# Patient Record
Sex: MALE | Race: WHITE | NOT HISPANIC OR LATINO | URBAN - METROPOLITAN AREA
[De-identification: names, ages, dates, MRNs, and addresses within clinical notes are randomized per-mention and may not be internally consistent; named-entity substitution may affect disease eponyms.]

---

## 2020-03-04 ENCOUNTER — NEW PATIENT (OUTPATIENT)
Dept: URBAN - METROPOLITAN AREA CLINIC 85 | Facility: CLINIC | Age: 80
End: 2020-03-04
Payer: MEDICARE

## 2020-03-04 PROCEDURE — 92004 COMPRE OPH EXAM NEW PT 1/>: CPT | Performed by: OPHTHALMOLOGY

## 2020-03-04 RX ORDER — KETOROLAC TROMETHAMINE 5 MG/ML
0.5 % SOLUTION OPHTHALMIC
Qty: 1 | Refills: 1 | Status: INACTIVE
Start: 2020-03-04 | End: 2020-03-05

## 2020-03-04 ASSESSMENT — KERATOMETRY
OS: 42.25
OD: 42.38

## 2020-03-04 ASSESSMENT — VISUAL ACUITY
OS: 20/50
OD: 20/30

## 2020-03-04 ASSESSMENT — INTRAOCULAR PRESSURE
OD: 14
OS: 14

## 2020-03-05 ENCOUNTER — Encounter (OUTPATIENT)
Dept: URBAN - METROPOLITAN AREA CLINIC 85 | Facility: CLINIC | Age: 80
End: 2020-03-05
Payer: MEDICARE

## 2020-03-05 RX ORDER — PREDNISOLONE ACETATE 10 MG/ML
1 % SUSPENSION/ DROPS OPHTHALMIC
Qty: 1 | Refills: 1 | Status: INACTIVE
Start: 2020-03-05 | End: 2021-02-16

## 2020-05-22 ENCOUNTER — Encounter (OUTPATIENT)
Dept: URBAN - METROPOLITAN AREA CLINIC 85 | Facility: CLINIC | Age: 80
End: 2020-05-22
Payer: MEDICARE

## 2020-05-22 DIAGNOSIS — Z01.818 ENCOUNTER FOR OTHER PREPROCEDURAL EXAMINATION: Primary | ICD-10-CM

## 2020-05-22 PROCEDURE — 99213 OFFICE O/P EST LOW 20 MIN: CPT | Performed by: PHYSICIAN ASSISTANT

## 2021-02-16 ENCOUNTER — FOLLOW UP ESTABLISHED (OUTPATIENT)
Dept: URBAN - METROPOLITAN AREA CLINIC 85 | Facility: CLINIC | Age: 81
End: 2021-02-16
Payer: MEDICARE

## 2021-02-16 PROCEDURE — 92134 CPTRZ OPH DX IMG PST SGM RTA: CPT | Performed by: OPHTHALMOLOGY

## 2021-02-16 PROCEDURE — 92014 COMPRE OPH EXAM EST PT 1/>: CPT | Performed by: OPHTHALMOLOGY

## 2021-02-16 RX ORDER — PREDNISOLONE ACETATE 10 MG/ML
1 % SUSPENSION/ DROPS OPHTHALMIC
Qty: 1 | Refills: 1 | Status: INACTIVE
Start: 2021-02-16 | End: 2021-05-25

## 2021-02-16 ASSESSMENT — INTRAOCULAR PRESSURE
OS: 14
OD: 15

## 2021-02-16 ASSESSMENT — VISUAL ACUITY
OD: 20/30
OS: 20/60

## 2021-02-16 ASSESSMENT — KERATOMETRY
OD: 42.50
OS: 42.38

## 2021-05-25 ENCOUNTER — OFFICE VISIT (OUTPATIENT)
Dept: URBAN - METROPOLITAN AREA CLINIC 85 | Facility: CLINIC | Age: 81
End: 2021-05-25
Payer: MEDICARE

## 2021-05-25 DIAGNOSIS — H25.11 AGE-RELATED NUCLEAR CATARACT, RIGHT EYE: ICD-10-CM

## 2021-05-25 PROCEDURE — 92014 COMPRE OPH EXAM EST PT 1/>: CPT | Performed by: STUDENT IN AN ORGANIZED HEALTH CARE EDUCATION/TRAINING PROGRAM

## 2021-05-25 PROCEDURE — 92134 CPTRZ OPH DX IMG PST SGM RTA: CPT | Performed by: STUDENT IN AN ORGANIZED HEALTH CARE EDUCATION/TRAINING PROGRAM

## 2021-05-25 PROCEDURE — 92136 OPHTHALMIC BIOMETRY: CPT | Performed by: STUDENT IN AN ORGANIZED HEALTH CARE EDUCATION/TRAINING PROGRAM

## 2021-05-25 ASSESSMENT — INTRAOCULAR PRESSURE
OD: 14
OS: 15

## 2021-05-25 ASSESSMENT — VISUAL ACUITY
OD: 20/25
OS: 20/60

## 2021-05-25 ASSESSMENT — KERATOMETRY
OS: 42.25
OD: 42.38

## 2021-05-25 NOTE — IMPRESSION/PLAN
Impression: Age-related nuclear cataract, bilateral: H25.13. Plan: Discussed cataracts, treatment options, and surgical risks/benefits with patient including bleeding, infection, capsular break, glaucoma, corneal clouding. Pt understands changing glasses will not improve vision/glare. Discussed Premium options available. Pt understands the need for glasses after surgery if advanced technology lenses are not chosen and that there is NO PERFECT IOL. Pt understands that even the highest ATLs have limitations including but not limited to Halos/Glare/Difficulty in Dim Lighting and Intermediate BCVA may need glasses after SX. Patient elects surgical treatment. Recommend ORA. Recommend Dexycu + Moxifloxacin (PF) Intracameral Injection. Lens Recommendation: 
Technology: OK for ORA and LENSX Aim OD: -0.25. Aim OS: -0.25. First Eye: OS first then OD Notes:

## 2021-05-25 NOTE — IMPRESSION/PLAN
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 Plan: This can result in decrease or fluctuating vision. Reviewed with patient there is no cure and daily maintenance is needed. Recommend artificial tears qid or more ou. Artificial tears can be purchased OTC. Discussed with patient artificial tears may be used as often as needed.

## 2021-06-01 ENCOUNTER — ADULT PHYSICAL (OUTPATIENT)
Dept: URBAN - METROPOLITAN AREA CLINIC 85 | Facility: CLINIC | Age: 81
End: 2021-06-01
Payer: MEDICARE

## 2021-06-01 DIAGNOSIS — H25.13 AGE-RELATED NUCLEAR CATARACT, BILATERAL: ICD-10-CM

## 2021-06-01 PROCEDURE — 99213 OFFICE O/P EST LOW 20 MIN: CPT | Performed by: NURSE PRACTITIONER

## 2021-06-07 ENCOUNTER — SURGERY (OUTPATIENT)
Dept: URBAN - METROPOLITAN AREA SURGERY 55 | Facility: SURGERY | Age: 81
End: 2021-06-07
Payer: MEDICARE

## 2021-06-07 PROCEDURE — 66984 XCAPSL CTRC RMVL W/O ECP: CPT | Performed by: STUDENT IN AN ORGANIZED HEALTH CARE EDUCATION/TRAINING PROGRAM

## 2021-06-08 ENCOUNTER — POST-OPERATIVE VISIT (OUTPATIENT)
Dept: URBAN - METROPOLITAN AREA CLINIC 85 | Facility: CLINIC | Age: 81
End: 2021-06-08
Payer: MEDICARE

## 2021-06-08 DIAGNOSIS — Z48.810 ENCOUNTER FOR SURGICAL AFTERCARE FOLLOWING SURGERY ON A SENSE ORGAN: Primary | ICD-10-CM

## 2021-06-08 PROCEDURE — 99024 POSTOP FOLLOW-UP VISIT: CPT | Performed by: OPTOMETRIST

## 2021-06-08 ASSESSMENT — INTRAOCULAR PRESSURE: OS: 22

## 2021-06-08 NOTE — IMPRESSION/PLAN
Impression: S/P Cataract Extraction by phacoemulsification with IOL placement OS - 1 Day. Encounter for surgical aftercare following surgery on a sense organ  Z48.810. Discussed corneal edemak. Plan: Initiate Elizabeth 128 QID OS. RTC as scheduled or ASAP if decreased VA, pain, or any worsening of condtion. Follow post op instruction as directed.

## 2021-06-14 ENCOUNTER — POST-OPERATIVE VISIT (OUTPATIENT)
Dept: URBAN - METROPOLITAN AREA CLINIC 85 | Facility: CLINIC | Age: 81
End: 2021-06-14
Payer: MEDICARE

## 2021-06-14 PROCEDURE — 99024 POSTOP FOLLOW-UP VISIT: CPT | Performed by: OPTOMETRIST

## 2021-06-14 ASSESSMENT — INTRAOCULAR PRESSURE
OD: 11
OS: 13

## 2021-06-14 ASSESSMENT — VISUAL ACUITY
OD: 20/25 -2
OS: 20/20 -1

## 2021-06-21 ENCOUNTER — SURGERY (OUTPATIENT)
Dept: URBAN - METROPOLITAN AREA SURGERY 55 | Facility: SURGERY | Age: 81
End: 2021-06-21
Payer: MEDICARE

## 2021-06-21 PROCEDURE — 66984 XCAPSL CTRC RMVL W/O ECP: CPT | Performed by: STUDENT IN AN ORGANIZED HEALTH CARE EDUCATION/TRAINING PROGRAM

## 2021-06-22 ENCOUNTER — POST-OPERATIVE VISIT (OUTPATIENT)
Dept: URBAN - METROPOLITAN AREA CLINIC 85 | Facility: CLINIC | Age: 81
End: 2021-06-22

## 2021-06-22 DIAGNOSIS — Z96.1 PRESENCE OF INTRAOCULAR LENS: Primary | ICD-10-CM

## 2021-06-22 PROCEDURE — 99024 POSTOP FOLLOW-UP VISIT: CPT | Performed by: OPTOMETRIST

## 2021-06-22 ASSESSMENT — INTRAOCULAR PRESSURE: OD: 18

## 2021-06-22 NOTE — IMPRESSION/PLAN
Impression: S/P Cataract Extraction by phacoemulsification with IOL placement OD - 1 Day. Presence of intraocular lens  Z96.1. Plan: The patient was instructed to contact their eye care provider ASAP if there should be any decrease in vision, pain, or any worsening of condition.

## 2021-07-12 ENCOUNTER — POST-OPERATIVE VISIT (OUTPATIENT)
Dept: URBAN - METROPOLITAN AREA CLINIC 85 | Facility: CLINIC | Age: 81
End: 2021-07-12
Payer: MEDICARE

## 2021-07-12 DIAGNOSIS — H52.4 PRESBYOPIA: ICD-10-CM

## 2021-07-12 PROCEDURE — 99024 POSTOP FOLLOW-UP VISIT: CPT | Performed by: OPTOMETRIST

## 2021-07-12 ASSESSMENT — VISUAL ACUITY
OD: 20/20-2
OS: 20/20-1

## 2021-07-12 ASSESSMENT — INTRAOCULAR PRESSURE
OS: 13
OD: 10

## 2021-07-12 NOTE — IMPRESSION/PLAN
Impression: S/P Cataract Extraction by phacoemulsification with IOL placement OD - 21 Days. Presence of intraocular lens  Z96.1.  Plan: Return 6 months CEE

## 2022-03-16 ENCOUNTER — OFFICE VISIT (OUTPATIENT)
Dept: URBAN - METROPOLITAN AREA CLINIC 85 | Facility: CLINIC | Age: 82
End: 2022-03-16
Payer: MEDICARE

## 2022-03-16 DIAGNOSIS — H04.123 DRY EYE SYNDROME OF BILATERAL LACRIMAL GLANDS: Primary | ICD-10-CM

## 2022-03-16 DIAGNOSIS — H26.493 OTHER SECONDARY CATARACT, BILATERAL: ICD-10-CM

## 2022-03-16 PROCEDURE — 92014 COMPRE OPH EXAM EST PT 1/>: CPT | Performed by: OPTOMETRIST

## 2022-03-16 ASSESSMENT — INTRAOCULAR PRESSURE
OD: 17
OS: 16

## 2022-03-16 ASSESSMENT — KERATOMETRY
OS: 42.25
OD: 42.13

## 2022-03-16 ASSESSMENT — VISUAL ACUITY
OD: 20/20
OS: 20/20

## 2022-08-04 ENCOUNTER — OFFICE VISIT (OUTPATIENT)
Dept: URBAN - METROPOLITAN AREA CLINIC 85 | Facility: CLINIC | Age: 82
End: 2022-08-04
Payer: MEDICARE

## 2022-08-04 DIAGNOSIS — H43.811 VITREOUS DEGENERATION, RIGHT EYE: Primary | ICD-10-CM

## 2022-08-04 DIAGNOSIS — H04.123 DRY EYE SYNDROME OF BILATERAL LACRIMAL GLANDS: ICD-10-CM

## 2022-08-04 DIAGNOSIS — H43.392 OTHER VITREOUS OPACITIES, LEFT EYE: ICD-10-CM

## 2022-08-04 DIAGNOSIS — H26.493 OTHER SECONDARY CATARACT, BILATERAL: ICD-10-CM

## 2022-08-04 PROCEDURE — 92201 OPSCPY EXTND RTA DRAW UNI/BI: CPT | Performed by: OPTOMETRIST

## 2022-08-04 PROCEDURE — 92134 CPTRZ OPH DX IMG PST SGM RTA: CPT | Performed by: OPTOMETRIST

## 2022-08-04 PROCEDURE — 92014 COMPRE OPH EXAM EST PT 1/>: CPT | Performed by: OPTOMETRIST

## 2022-08-04 ASSESSMENT — INTRAOCULAR PRESSURE
OS: 16
OD: 16

## 2022-08-04 NOTE — IMPRESSION/PLAN
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 Plan: Discussed dry eye diagnosis in detail. Recommend Systane Complete QID OU. Discussed prescription medication options such as Restasis and Chano Funmilayo in the future. Also discussed potential of punctal plugs/punctal cautery.

## 2022-08-04 NOTE — IMPRESSION/PLAN
Impression: Other secondary cataract, bilateral: H26.493. Plan: The risks and benefits of YAG Capsulotomy were discussed as were post-op restrictions and likelihood of increased floaters postoperatively which may require additional treatment. The patient elects t monitor.